# Patient Record
Sex: MALE | Race: OTHER | Employment: FULL TIME | URBAN - METROPOLITAN AREA
[De-identification: names, ages, dates, MRNs, and addresses within clinical notes are randomized per-mention and may not be internally consistent; named-entity substitution may affect disease eponyms.]

---

## 2019-07-10 ENCOUNTER — HOSPITAL ENCOUNTER (EMERGENCY)
Age: 24
Discharge: HOME | End: 2019-07-10
Attending: EMERGENCY MEDICINE

## 2019-07-10 VITALS
HEART RATE: 86 BPM | TEMPERATURE: 98.2 F | RESPIRATION RATE: 17 BRPM | SYSTOLIC BLOOD PRESSURE: 129 MMHG | DIASTOLIC BLOOD PRESSURE: 76 MMHG | OXYGEN SATURATION: 98 %

## 2019-07-10 DIAGNOSIS — F41.1 ANXIETY STATE: Primary | ICD-10-CM

## 2019-07-10 PROCEDURE — 99282 EMERGENCY DEPT VISIT SF MDM: CPT

## 2019-07-10 ASSESSMENT — ENCOUNTER SYMPTOMS
SORE THROAT: 0
RHINORRHEA: 0
NAUSEA: 0
SHORTNESS OF BREATH: 1
COLOR CHANGE: 0
ABDOMINAL PAIN: 0
VOMITING: 0

## 2019-07-10 NOTE — ED PROVIDER NOTES
[facility]  ED  Emergency Department Encounter  EmergencyMedicine Resident     Pt Name:Hao Macias  MRN: [id number]  [place] 1995  Date of evaluation: 7/10/19  PCP:  No primary care provider on file. CHIEF COMPLAINT       Chief Complaint   Patient presents with    Panic Attack       HISTORY OF PRESENT ILLNESS  (Location/Symptom, Timing/Onset, Context/Setting, Quality, Duration, Modifying Factors, Severity.)      Cresencio Davies is a 26 y.o. male who presents with shortness of breath, feeling that his throat was closing, diffuse numbness, and cramping of his hands and feet which began approximately 2 hours prior to arrival and resolved spontaneously after approximately 45 minutes. Patient has a history of panic attacks. He denies any daily medication use. He denies any tobacco or drug use. He denies any associated chest pain, nausea, vomiting, or diaphoresis. He states that his symptoms feel similar to past history of panic attacks, however this is worse than normal for him. PAST MEDICAL / SURGICAL / SOCIAL / FAMILY HISTORY      has a past medical history of Panic attacks. has no past surgical history on file.     Social History     Socioeconomic History    Marital status: Single     Spouse name: Not on file    Number of children: Not on file    Years of education: Not on file    Highest education level: Not on file   Occupational History    Not on file   Social Needs    Financial resource strain: Not on file    Food insecurity:     Worry: Not on file     Inability: Not on file    Transportation needs:     Medical: Not on file     Non-medical: Not on file   Tobacco Use    Smoking status: Never Smoker   Substance and Sexual Activity    Alcohol use: Yes     Comment: occasionally    Drug use: No    Sexual activity: Not on file   Lifestyle    Physical activity:     Days per week: Not on file     Minutes per session: Not on file    Stress: Not on file   Relationships    Social

## 2019-07-15 PROBLEM — F41.0 PANIC DISORDER: Status: ACTIVE | Noted: 2019-07-15

## 2019-07-15 PROBLEM — F41.9 ANXIETY: Status: ACTIVE | Noted: 2019-07-15

## 2019-07-18 ENCOUNTER — HOSPITAL ENCOUNTER (OUTPATIENT)
Age: 24
Setting detail: SPECIMEN
Discharge: HOME | End: 2019-07-18
Payer: COMMERCIAL

## 2019-07-18 ENCOUNTER — OFFICE VISIT (OUTPATIENT)
Dept: FAMILY MEDICINE CLINIC | Age: 24
End: 2019-07-18
Payer: COMMERCIAL

## 2019-07-18 VITALS
DIASTOLIC BLOOD PRESSURE: 72 MMHG | RESPIRATION RATE: 16 BRPM | HEART RATE: 81 BPM | BODY MASS INDEX: 19.28 KG/M2 | HEIGHT: 69 IN | SYSTOLIC BLOOD PRESSURE: 127 MMHG | WEIGHT: 130.2 LBS | TEMPERATURE: 98 F | OXYGEN SATURATION: 96 %

## 2019-07-18 DIAGNOSIS — Z00.00 ROUTINE GENERAL MEDICAL EXAMINATION AT A HEALTH CARE FACILITY: ICD-10-CM

## 2019-07-18 DIAGNOSIS — Z76.89 ESTABLISHING CARE WITH NEW DOCTOR, ENCOUNTER FOR: ICD-10-CM

## 2019-07-18 DIAGNOSIS — F41.9 ANXIETY: ICD-10-CM

## 2019-07-18 DIAGNOSIS — Z00.00 ROUTINE GENERAL MEDICAL EXAMINATION AT A HEALTH CARE FACILITY: Primary | ICD-10-CM

## 2019-07-18 PROBLEM — F41.0 PANIC DISORDER: Status: RESOLVED | Noted: 2019-07-15 | Resolved: 2019-07-18

## 2019-07-18 LAB
ABSOLUTE EOS #: 0.11 K/UL (ref 0–0.44)
ABSOLUTE IMMATURE GRANULOCYTE: <0.03 K/UL (ref 0–0.3)
ABSOLUTE LYMPH #: 1.72 K/UL (ref 1.1–3.7)
ABSOLUTE MONO #: 0.45 K/UL (ref 0.1–1.2)
ANION GAP SERPL CALCULATED.3IONS-SCNC: 13 MMOL/L (ref 9–17)
BASOPHILS # BLD: 2 % (ref 0–2)
BASOPHILS ABSOLUTE: 0.07 K/UL (ref 0–0.2)
BUN BLDV-MCNC: 14 MG/DL (ref 6–20)
BUN/CREAT BLD: ABNORMAL (ref 9–20)
CALCIUM SERPL-MCNC: 9.7 MG/DL (ref 8.6–10.4)
CHLORIDE BLD-SCNC: 104 MMOL/L (ref 98–107)
CHOLESTEROL/HDL RATIO: 2.6
CHOLESTEROL: 155 MG/DL
CO2: 25 MMOL/L (ref 20–31)
CREAT SERPL-MCNC: 0.68 MG/DL (ref 0.7–1.2)
DIFFERENTIAL TYPE: NORMAL
EOSINOPHILS RELATIVE PERCENT: 2 % (ref 1–4)
ESTIMATED AVERAGE GLUCOSE: 94 MG/DL
GFR AFRICAN AMERICAN: >60 ML/MIN
GFR NON-AFRICAN AMERICAN: >60 ML/MIN
GFR SERPL CREATININE-BSD FRML MDRD: ABNORMAL ML/MIN/{1.73_M2}
GFR SERPL CREATININE-BSD FRML MDRD: ABNORMAL ML/MIN/{1.73_M2}
GLUCOSE BLD-MCNC: 79 MG/DL (ref 70–99)
HBA1C MFR BLD: 4.9 % (ref 4–6)
HCT VFR BLD CALC: 43.4 % (ref 40.7–50.3)
HDLC SERPL-MCNC: 60 MG/DL
HEMOGLOBIN: 14.1 G/DL (ref 13–17)
HIV AG/AB: NONREACTIVE
IMMATURE GRANULOCYTES: 0 %
LDL CHOLESTEROL: 84 MG/DL (ref 0–130)
LYMPHOCYTES # BLD: 37 % (ref 24–43)
MCH RBC QN AUTO: 29.5 PG (ref 25.2–33.5)
MCHC RBC AUTO-ENTMCNC: 32.5 G/DL (ref 28.4–34.8)
MCV RBC AUTO: 90.8 FL (ref 82.6–102.9)
MONOCYTES # BLD: 10 % (ref 3–12)
NRBC AUTOMATED: 0 PER 100 WBC
PDW BLD-RTO: 12.2 % (ref 11.8–14.4)
PLATELET # BLD: 326 K/UL (ref 138–453)
PLATELET ESTIMATE: NORMAL
PMV BLD AUTO: 9.4 FL (ref 8.1–13.5)
POTASSIUM SERPL-SCNC: 4.6 MMOL/L (ref 3.7–5.3)
RBC # BLD: 4.78 M/UL (ref 4.21–5.77)
RBC # BLD: NORMAL 10*6/UL
SEG NEUTROPHILS: 49 % (ref 36–65)
SEGMENTED NEUTROPHILS ABSOLUTE COUNT: 2.33 K/UL (ref 1.5–8.1)
SODIUM BLD-SCNC: 142 MMOL/L (ref 135–144)
TRIGL SERPL-MCNC: 55 MG/DL
VLDLC SERPL CALC-MCNC: NORMAL MG/DL (ref 1–30)
WBC # BLD: 4.7 K/UL (ref 3.5–11.3)
WBC # BLD: NORMAL 10*3/UL

## 2019-07-18 PROCEDURE — 99385 PREV VISIT NEW AGE 18-39: CPT | Performed by: FAMILY MEDICINE

## 2019-07-18 ASSESSMENT — PATIENT HEALTH QUESTIONNAIRE - PHQ9
SUM OF ALL RESPONSES TO PHQ QUESTIONS 1-9: 0
1. LITTLE INTEREST OR PLEASURE IN DOING THINGS: 0
SUM OF ALL RESPONSES TO PHQ QUESTIONS 1-9: 0
2. FEELING DOWN, DEPRESSED OR HOPELESS: 0
SUM OF ALL RESPONSES TO PHQ9 QUESTIONS 1 & 2: 0

## 2019-07-18 ASSESSMENT — ENCOUNTER SYMPTOMS
BLOOD IN STOOL: 0
SHORTNESS OF BREATH: 0
EYE PAIN: 0

## 2019-07-18 NOTE — PROGRESS NOTES
and watching YouTube videos during his lunch. I am wondering whether it was heat exhaustion from working in a hot area. Discussed strategies to prevent overheating including ice cold water by him at all times, bringing his own phone from home to help cool down the area. He will monitor for any future symptoms. Labs ordered to assess for any abnormalities in glucose her electrolytes. Follow-up as needed. He declined referral for counseling for the anxiety as he states his symptoms are in remission. Recommended eye exam.    Call or return to clinic prn if these symptoms worsen or fail to improve as anticipated. I have reviewed the instructions with the patient, answering all questions to their satisfaction.     Electronically signed by Iker Montano MD on 7/18/2019 at 11:36 AM

## 2019-08-03 ENCOUNTER — HOSPITAL ENCOUNTER (EMERGENCY)
Age: 24
Discharge: HOME | End: 2019-08-03
Attending: EMERGENCY MEDICINE
Payer: COMMERCIAL

## 2019-08-03 VITALS
RESPIRATION RATE: 16 BRPM | SYSTOLIC BLOOD PRESSURE: 137 MMHG | WEIGHT: 140 LBS | TEMPERATURE: 98.6 F | OXYGEN SATURATION: 98 % | HEIGHT: 69 IN | BODY MASS INDEX: 20.73 KG/M2 | DIASTOLIC BLOOD PRESSURE: 78 MMHG | HEART RATE: 75 BPM

## 2019-08-03 DIAGNOSIS — F41.0 PANIC ATTACK: Primary | ICD-10-CM

## 2019-08-03 PROCEDURE — 99284 EMERGENCY DEPT VISIT MOD MDM: CPT

## 2019-08-03 PROCEDURE — 93005 ELECTROCARDIOGRAM TRACING: CPT

## 2019-08-03 ASSESSMENT — ENCOUNTER SYMPTOMS
CONSTIPATION: 0
EYE PAIN: 0
ABDOMINAL PAIN: 0
DIARRHEA: 0
VOMITING: 0
PHOTOPHOBIA: 0
NAUSEA: 0
SHORTNESS OF BREATH: 1
SORE THROAT: 0

## 2019-08-03 ASSESSMENT — VISUAL ACUITY
OU: 20/20
OS: 20/20
OD: 20/20

## 2019-08-03 NOTE — ED PROVIDER NOTES
[facility]     Emergency Department     Faculty Note/ Attestation      Pt Name: Loren Crain                                       MRN: [id number]  [place] 1995  Date of evaluation: 8/3/2019    Patients PCP:    Sunni Doan MD      Attestation  I performed a history and physical examination of the patient and discussed management with the resident. I reviewed the residents note and agree with the documented findings and plan of care. Any areas of disagreement are noted on the chart. I was personally present for the key portions of any procedures. I have documented in the chart those procedures where I was not present during the key portions. I have reviewed the emergency nurses triage note. I agree with the chief complaint, past medical history, past surgical history, allergies, medications, social and family history as documented unless otherwise noted below. For Physician Assistant/ Nurse Practitioner cases/documentation I have personally evaluated this patient and have completed at least one if not all key elements of the E/M (history, physical exam, and MDM). Additional findings are as noted. Initial Screens:  NIH Stroke Scale  Interval: Baseline  Level of Consciousness (1a. ): Alert  LOC Questions (1b. ):  Answers both correctly  LOC Commands (1c. ): Obeys both correctly  Best Gaze (2. ): Normal  Visual (3. ): No visual loss  Facial Palsy (4. ): Normal  Motor Arm, Left (5a. ): No drift  Motor Arm, Right (5b. ): No drift  Motor Leg, Left (6a. ): No drift  Motor Leg, Right (6b. ): No drift  Limb Ataxia (7. ): Absent  Sensory (8. ): Normal  Best Language (9. ): No aphasia  Dysarthria (10. ): Normal  Extinction and Inattention (11): No neglect  Total: 0     Moni Coma Scale  Eye Opening: Spontaneous  Best Verbal Response: Oriented  Best Motor Response: Obeys commands  [place] Coma Scale Score: 15    Vitals:    Vitals:    08/03/19 0005   BP: 137/78   Pulse: 75   Resp: 16 Temp: 98.6 °F (37 °C)   TempSrc: Oral   SpO2: 98%   Weight: 140 lb (63.5 kg)   Height: 5' 9\" (1.753 m)       CHIEF COMPLAINT       Chief Complaint   Patient presents with    Anxiety     started today     Blurred Vision     1 epsiode july 8th, vision hasnt gotten any better              DIAGNOSTIC RESULTS             RADIOLOGY:   No orders to display         LABS:  Labs Reviewed - No data to display      EMERGENCY DEPARTMENT COURSE:     -------------------------  BP: 137/78, Temp: 98.6 °F (37 °C), Pulse: 75, Resp: 16      Comments    27 yo with visual changes, SOB, inc HR  Resolves spontaneously, has had several of this episodes  This episode lasted 20 min  Saw PCP after first episode, offered xanax and he declined    Will get EKG and refer to psych and PCP for further evaluation    (Please note that portions of this note were completed with a voice recognition program.  Efforts were made to edit the dictations but occasionally words are mis-transcribed.)      Delgado MD  Attending Emergency Physician          Rodriguez Bella MD  08/09/19 [address] [facility], [place]  08/09/19 [id number]

## 2019-08-03 NOTE — ED NOTES
Received pt from EMS. Pt was brought from work after he started experiencing symptoms such as blurred vision, \" locked up \" arms. Pt states he has hx of anxiety but denies any trigger tonight.  Pt denies any CP, SOB, N/V/D, fever, chills/      Yaniv Felder RN  08/03/19 [id number]

## 2019-08-04 LAB
EKG ATRIAL RATE: 58 BPM
EKG P AXIS: 32 DEGREES
EKG P-R INTERVAL: 146 MS
EKG Q-T INTERVAL: 386 MS
EKG QRS DURATION: 94 MS
EKG QTC CALCULATION (BAZETT): 378 MS
EKG R AXIS: 33 DEGREES
EKG T AXIS: 49 DEGREES
EKG VENTRICULAR RATE: 58 BPM

## 2019-08-07 ENCOUNTER — OFFICE VISIT (OUTPATIENT)
Dept: FAMILY MEDICINE CLINIC | Age: 24
End: 2019-08-07
Payer: COMMERCIAL

## 2019-08-07 VITALS
HEART RATE: 74 BPM | BODY MASS INDEX: 19.91 KG/M2 | OXYGEN SATURATION: 100 % | SYSTOLIC BLOOD PRESSURE: 118 MMHG | DIASTOLIC BLOOD PRESSURE: 69 MMHG | WEIGHT: 134.8 LBS | RESPIRATION RATE: 16 BRPM

## 2019-08-07 DIAGNOSIS — H53.9 CHANGE IN VISION: Primary | ICD-10-CM

## 2019-08-07 DIAGNOSIS — F41.9 ANXIETY: ICD-10-CM

## 2019-08-07 DIAGNOSIS — R20.0 NUMBNESS: ICD-10-CM

## 2019-08-07 PROCEDURE — 99213 OFFICE O/P EST LOW 20 MIN: CPT | Performed by: FAMILY MEDICINE

## 2019-08-07 ASSESSMENT — ENCOUNTER SYMPTOMS
SHORTNESS OF BREATH: 0
EYE PAIN: 0
PHOTOPHOBIA: 0
EYE DISCHARGE: 0
EYE REDNESS: 0

## 2019-08-07 NOTE — PROGRESS NOTES
MD [facility]  [id number] [facility] [address] [facility] [address]  Dept: [phone]    Shawn Esquivel is a 26 y.o. male who presents today for hismedical conditions/complaints as noted below. Shawn Esquivel is here today c/o Discuss Labs       HPI:     HPI    ER 8/3 for body locking attack, had a similar episode a month ago, since then he has noted some blurry vision, has not seen an eye doctor, in the ER diagnosed as panic attack and told to see PCP in optometry  Episodes 7/8, 8/3 occurred while at work, 8/4 episode occurred while at home  Hands/face went numb, body felt locked, worsening of the blurry vision, no LOC or confusion, he was fully aware of his symptoms during the episode, has not been back to work for the past 4 days  Tried CBD wax pen during the episode that occurred at home and felt sx improved  Denies feeling anxious prior to his symptoms starting, the episodes have been in turn causing anxiety  Work and his relationship are good, no SI    Patient Active Problem List   Diagnosis    Anxiety       Past Medical History:   Diagnosis Date    Panic attacks      No past surgical history on file. Family History   Problem Relation Age of Onset    No Known Problems Mother     No Known Problems Father      Social History     Tobacco Use    Smoking status: Never Smoker    Smokeless tobacco: Never Used   Substance Use Topics    Alcohol use: Yes     Comment: occasionally    Drug use: No       Current Outpatient Medications:     sertraline (ZOLOFT) 50 MG tablet, Take 1 tablet by mouth daily, Disp: 30 tablet, Rfl: 1    Subjective:     Review of Systems   Constitutional: Negative for fever. Eyes: Positive for visual disturbance. Negative for photophobia, pain, discharge and redness. Respiratory: Negative for shortness of breath. Cardiovascular: Negative for chest pain.        Objective:     /69   Pulse 74   Resp 16   Wt 134 lb

## 2019-08-12 ENCOUNTER — TELEPHONE (OUTPATIENT)
Dept: FAMILY MEDICINE CLINIC | Age: 24
End: 2019-08-12

## 2019-08-14 ENCOUNTER — HOSPITAL ENCOUNTER (OUTPATIENT)
Dept: MRI IMAGING | Facility: CLINIC | Age: 24
Discharge: HOME | End: 2019-08-16
Payer: COMMERCIAL

## 2019-08-14 DIAGNOSIS — R20.0 NUMBNESS: ICD-10-CM

## 2019-08-14 DIAGNOSIS — H53.9 CHANGE IN VISION: ICD-10-CM

## 2019-08-14 PROCEDURE — 70551 MRI BRAIN STEM W/O DYE: CPT
